# Patient Record
Sex: MALE | Race: WHITE | NOT HISPANIC OR LATINO | Employment: FULL TIME | URBAN - METROPOLITAN AREA
[De-identification: names, ages, dates, MRNs, and addresses within clinical notes are randomized per-mention and may not be internally consistent; named-entity substitution may affect disease eponyms.]

---

## 2018-09-07 ENCOUNTER — HOSPITAL ENCOUNTER (EMERGENCY)
Facility: HOSPITAL | Age: 48
Discharge: HOME | End: 2018-09-07
Attending: EMERGENCY MEDICINE
Payer: COMMERCIAL

## 2018-09-07 ENCOUNTER — APPOINTMENT (EMERGENCY)
Dept: RADIOLOGY | Facility: HOSPITAL | Age: 48
End: 2018-09-07
Attending: EMERGENCY MEDICINE
Payer: COMMERCIAL

## 2018-09-07 VITALS
OXYGEN SATURATION: 97 % | WEIGHT: 188 LBS | TEMPERATURE: 97.2 F | SYSTOLIC BLOOD PRESSURE: 112 MMHG | DIASTOLIC BLOOD PRESSURE: 61 MMHG | RESPIRATION RATE: 20 BRPM | HEART RATE: 52 BPM | BODY MASS INDEX: 30.22 KG/M2 | HEIGHT: 66 IN

## 2018-09-07 DIAGNOSIS — R07.9 CHEST PAIN, UNSPECIFIED TYPE: Primary | ICD-10-CM

## 2018-09-07 LAB
ANION GAP SERPL CALC-SCNC: 8 MEQ/L (ref 3–15)
APTT PPP: 28 SEC (ref 23–35)
BASOPHILS # BLD: 0.03 K/UL (ref 0.01–0.1)
BASOPHILS NFR BLD: 0.4 %
BUN SERPL-MCNC: 8 MG/DL (ref 8–20)
CALCIUM SERPL-MCNC: 9.2 MG/DL (ref 8.9–10.3)
CHLORIDE SERPL-SCNC: 107 MEQ/L (ref 98–109)
CO2 SERPL-SCNC: 25 MEQ/L (ref 22–32)
CREAT SERPL-MCNC: 0.7 MG/DL (ref 0.8–1.3)
DIFFERENTIAL METHOD BLD: NORMAL
EOSINOPHIL # BLD: 0.49 K/UL (ref 0.04–0.54)
EOSINOPHIL NFR BLD: 7 %
ERYTHROCYTE [DISTWIDTH] IN BLOOD BY AUTOMATED COUNT: 13.1 % (ref 11.6–14.4)
GFR SERPL CREATININE-BSD FRML MDRD: >60 ML/MIN/1.73M*2
GLUCOSE SERPL-MCNC: 133 MG/DL (ref 70–99)
HCT VFR BLDCO AUTO: 44.8 % (ref 40.1–51)
HGB BLD-MCNC: 15.5 G/DL (ref 13.7–17.5)
IMM GRANULOCYTES # BLD AUTO: 0.02 K/UL (ref 0–0.08)
IMM GRANULOCYTES NFR BLD AUTO: 0.3 %
INR PPP: 0.9 INR
LYMPHOCYTES # BLD: 2.13 K/UL (ref 1.2–3.5)
LYMPHOCYTES NFR BLD: 30.4 %
MCH RBC QN AUTO: 33 PG (ref 28–33.2)
MCHC RBC AUTO-ENTMCNC: 34.6 G/DL (ref 32.2–36.5)
MCV RBC AUTO: 95.3 FL (ref 83–98)
MONOCYTES # BLD: 0.49 K/UL (ref 0.3–1)
MONOCYTES NFR BLD: 7 %
NEUTROPHILS # BLD: 3.85 K/UL (ref 1.7–7)
NEUTS SEG NFR BLD: 54.9 %
NRBC BLD-RTO: 0 %
PDW BLD AUTO: 9 FL (ref 9.4–12.4)
PLATELET # BLD AUTO: 228 K/UL (ref 150–350)
POTASSIUM SERPL-SCNC: 4.2 MEQ/L (ref 3.6–5.1)
PROTHROMBIN TIME: 12.2 SEC (ref 12.2–14.5)
RAINBOW HOLD SPECIMEN: NORMAL
RAINBOW HOLD SPECIMEN: NORMAL
RBC # BLD AUTO: 4.7 M/UL (ref 4.5–5.8)
SODIUM SERPL-SCNC: 140 MEQ/L (ref 136–144)
TROPONIN I SERPL-MCNC: <0.02 NG/ML
WBC # BLD AUTO: 7.01 K/UL (ref 3.8–10.5)

## 2018-09-07 PROCEDURE — 36415 COLL VENOUS BLD VENIPUNCTURE: CPT | Performed by: EMERGENCY MEDICINE

## 2018-09-07 PROCEDURE — 63700000 HC SELF-ADMINISTRABLE DRUG: Performed by: EMERGENCY MEDICINE

## 2018-09-07 PROCEDURE — 85730 THROMBOPLASTIN TIME PARTIAL: CPT | Performed by: EMERGENCY MEDICINE

## 2018-09-07 PROCEDURE — 85025 COMPLETE CBC W/AUTO DIFF WBC: CPT | Performed by: EMERGENCY MEDICINE

## 2018-09-07 PROCEDURE — 99285 EMERGENCY DEPT VISIT HI MDM: CPT | Mod: 25

## 2018-09-07 PROCEDURE — 84484 ASSAY OF TROPONIN QUANT: CPT | Performed by: EMERGENCY MEDICINE

## 2018-09-07 PROCEDURE — 71046 X-RAY EXAM CHEST 2 VIEWS: CPT

## 2018-09-07 PROCEDURE — 93005 ELECTROCARDIOGRAM TRACING: CPT | Performed by: EMERGENCY MEDICINE

## 2018-09-07 PROCEDURE — 85610 PROTHROMBIN TIME: CPT | Performed by: EMERGENCY MEDICINE

## 2018-09-07 PROCEDURE — 80048 BASIC METABOLIC PNL TOTAL CA: CPT | Performed by: EMERGENCY MEDICINE

## 2018-09-07 RX ORDER — NAPROXEN SODIUM 220 MG/1
324 TABLET, FILM COATED ORAL ONCE
Status: COMPLETED | OUTPATIENT
Start: 2018-09-07 | End: 2018-09-07

## 2018-09-07 RX ORDER — ASPIRIN 81 MG/1
81 TABLET ORAL DAILY
COMMUNITY

## 2018-09-07 RX ADMIN — ASPIRIN 81 MG 324 MG: 81 TABLET ORAL at 11:51

## 2018-09-07 ASSESSMENT — ENCOUNTER SYMPTOMS
FEVER: 0
PALPITATIONS: 0
BACK PAIN: 0
VOMITING: 0
SORE THROAT: 0
COUGH: 0
SEIZURES: 0
ARTHRALGIAS: 0
HEMATURIA: 0
SHORTNESS OF BREATH: 0
COLOR CHANGE: 0
EYE PAIN: 0
DYSURIA: 0
CHILLS: 0
ABDOMINAL PAIN: 0
NERVOUS/ANXIOUS: 1

## 2018-09-07 NOTE — ED ATTESTATION NOTE
Procedures  Physical Exam  Review of Systems    9/7/201811:05 AM  I have personally seen and examined the patient. I have reviewed and agree with the PA/NP/Resident's assessment and ED plan of care. My examination, assessment and plan of care of Dennis LINDSEY Blanche is as follows:    Patient is a 47-year-old male who presents with chest pain that is mainly present with deep inspiration, patient reports he was driving in his car at the time of onset, patient does have a coronary artery disease history and is status post stent placement, patient reports that he does have yearly stress tests and that they have not show any issues    Exam:   Heart: RRR, normal S1/S2  Lungs: CTA bilaterally  Abdo: soft, non-tender    Plan: Patient is a 47-year-old male who presents with chest discomfort, EKG does not show any acute injury, checking labs and imaging, reevaluate afterwards          Godshall, Duane K, MD  09/07/18 2527

## 2018-09-07 NOTE — DISCHARGE INSTRUCTIONS
Return if worsening or concerning symptoms.  Please follow up with your primary care physician and cardiologist this coming week.

## 2018-09-07 NOTE — ED PROVIDER NOTES
HPI     Chief Complaint   Patient presents with   • Chest Pain   • Shortness of Breath   • Anxiety     48 y/o M w/ hx of DM, CAD s/p stent, Bipolar p/w CP  -Sternal, non-radiating, constant, chest pressure since 9am.   -Thought it was a panic attack but came in to ED because of heart history  -Hx of MI in 2012 with stenting.  -Has cath last year without further stenting  -States this feels different than his MI in 2012  -No clear alleviating or exacerbating factors.  -Mild cough but admits to smoking.   -Denies congestion, n/v, diaphoresis           Patient History     Past Medical History:   Diagnosis Date   • Bipolar 1 disorder (CMS/[facility]) ([facility])    • Coronary artery disease    • Hypertension    • Lipid disorder    • Myocardial infarction    • Type 2 diabetes mellitus (CMS/[facility]) ([facility])        Past Surgical History:   Procedure Laterality Date   • CORONARY ANGIOPLASTY WITH STENT PLACEMENT         History reviewed. No pertinent family history.    Social History   Substance Use Topics   • Smoking status: Current Every Day Smoker     Packs/day: 1.00   • Smokeless tobacco: Never Used   • Alcohol use No       Systems Reviewed from Nursing Triage:          Review of Systems     Review of Systems   Constitutional: Negative for chills and fever.   HENT: Negative for ear pain and sore throat.    Eyes: Negative for pain and visual disturbance.   Respiratory: Negative for cough and shortness of breath.    Cardiovascular: Positive for chest pain. Negative for palpitations.   Gastrointestinal: Negative for abdominal pain and vomiting.   Genitourinary: Negative for dysuria and hematuria.   Musculoskeletal: Negative for arthralgias and back pain.   Skin: Negative for color change and rash.   Neurological: Negative for seizures and syncope.   Psychiatric/Behavioral: The patient is nervous/anxious.    All other systems reviewed and are negative.       Physical Exam     ED Triage Vitals [09/07/18 0943]   Temp Heart Rate Resp BP SpO2  "  37.1 °C (98.7 °F) (!) 57 20 (!) 108/59 96 %      Temp Source Heart Rate Source Patient Position BP Location FiO2 (%) (Set)   Temporal -- Sitting Left upper arm --                     Patient Vitals for the past 24 hrs:   BP Temp Temp src Pulse Resp SpO2 Height Weight   09/07/18 0943 (!) 108/59 37.1 °C (98.7 °F) Temporal (!) 57 20 96 % 1.676 m (5' 6\") 85.3 kg (188 lb)           Physical Exam   Constitutional: He appears well-developed and well-nourished.   HENT:   Head: Normocephalic and atraumatic.   Eyes: Conjunctivae are normal.   Neck: Neck supple.   Cardiovascular: Normal rate and regular rhythm.    No murmur heard.  Bradycardic. Left chest tattoo   Pulmonary/Chest: Effort normal and breath sounds normal. No respiratory distress.   Abdominal: Soft. There is no tenderness.   Musculoskeletal: He exhibits no edema.   Neurological: He is alert.   Skin: Skin is warm and dry.   Psychiatric: His behavior is normal.   Anxious affect   Nursing note and vitals reviewed.           Procedures    ED Course & MDM     Labs Reviewed   CBC AND DIFFERENTIAL    Narrative:     The following orders were created for panel order CBC and differential.  Procedure                               Abnormality         Status                     ---------                               -----------         ------                     CBC[[id number]]                                                                          Diff Count[[id number]]                                                                     Please view results for these tests on the individual orders.   BASIC METABOLIC PANEL   TROPONIN I   PROTIME-INR   PTT   CBC   DIFF COUNT       ECG 12 lead    (Results Pending)   X-RAY CHEST 2 VIEWS    (Results Pending)           MDM  CP.  Story more suspicion of anxiety induced, but given hx, will need to eval for ACS.  PERC negative.         ED Course as of Sep 07 2205   Fri Sep 07, 2018   1213 No longer in pain, will hold off on nitro " paste  [JK]      ED Course User Index  [JK] Palmer Owens MD         Clinical Impressions as of Sep 07 2205   Chest pain, unspecified type        Palmer Owens MD  Resident  09/07/18 2206

## 2018-09-08 LAB
ATRIAL RATE: 67
P AXIS: 20
PR INTERVAL: 148
QRS DURATION: 88
QT INTERVAL: 386
QTC CALCULATION(BAZETT): 407
R AXIS: 4
T WAVE AXIS: -9
VENTRICULAR RATE: 67

## 2021-04-08 DIAGNOSIS — Z23 ENCOUNTER FOR IMMUNIZATION: ICD-10-CM
